# Patient Record
Sex: MALE | Race: OTHER
[De-identification: names, ages, dates, MRNs, and addresses within clinical notes are randomized per-mention and may not be internally consistent; named-entity substitution may affect disease eponyms.]

---

## 2018-01-26 NOTE — ANETHESIA PREOPERATIVE EVAL
Anesthesia Pre-op PMH/ROS


General


Date of Evaluation:  Jan 26, 2018


Time of Evaluation:  06:41


Anesthesiologist:  kay


Mallampati Score


Class I : Soft palate, uvula, fauces, pillars visible


Class II: Soft palate, uvula, fauces visible


Class III: Soft palate, base of uvula visible


Class IV: Only hard plate visible


Surgeon:  madi


Diagnosis:  gerd


Surgical Procedure:  egd


Allergies:  


Coded Allergies:  


     ACETAMINOPHEN (Verified  Adverse Reaction, Severe, Severe Abdominal Pain , 

2/15/18)


     HYDROCODONE (Verified  Adverse Reaction, Severe, Severe Abdominal Pain , 2/

15/18)


Medications:  see eMAR





Anesthesia Pre-op A/P


Risk Assessment & Plan


Assessment:


asa


Plan:


ARAVIND Trevino Jan 26, 2018 06:42

## 2018-02-16 ENCOUNTER — HOSPITAL ENCOUNTER (OUTPATIENT)
Dept: HOSPITAL 72 - GAS | Age: 43
Discharge: HOME | End: 2018-02-16
Payer: COMMERCIAL

## 2018-02-16 VITALS — SYSTOLIC BLOOD PRESSURE: 136 MMHG | DIASTOLIC BLOOD PRESSURE: 92 MMHG

## 2018-02-16 VITALS — SYSTOLIC BLOOD PRESSURE: 147 MMHG | DIASTOLIC BLOOD PRESSURE: 95 MMHG

## 2018-02-16 VITALS — DIASTOLIC BLOOD PRESSURE: 91 MMHG | SYSTOLIC BLOOD PRESSURE: 140 MMHG

## 2018-02-16 VITALS — SYSTOLIC BLOOD PRESSURE: 167 MMHG | DIASTOLIC BLOOD PRESSURE: 100 MMHG

## 2018-02-16 VITALS — DIASTOLIC BLOOD PRESSURE: 98 MMHG | SYSTOLIC BLOOD PRESSURE: 151 MMHG

## 2018-02-16 VITALS — BODY MASS INDEX: 27.2 KG/M2 | HEIGHT: 70 IN | WEIGHT: 190 LBS

## 2018-02-16 VITALS — SYSTOLIC BLOOD PRESSURE: 149 MMHG | DIASTOLIC BLOOD PRESSURE: 102 MMHG

## 2018-02-16 VITALS — DIASTOLIC BLOOD PRESSURE: 104 MMHG | SYSTOLIC BLOOD PRESSURE: 147 MMHG

## 2018-02-16 VITALS — DIASTOLIC BLOOD PRESSURE: 101 MMHG | SYSTOLIC BLOOD PRESSURE: 166 MMHG

## 2018-02-16 VITALS — DIASTOLIC BLOOD PRESSURE: 99 MMHG | SYSTOLIC BLOOD PRESSURE: 146 MMHG

## 2018-02-16 VITALS — SYSTOLIC BLOOD PRESSURE: 148 MMHG | DIASTOLIC BLOOD PRESSURE: 94 MMHG

## 2018-02-16 VITALS — SYSTOLIC BLOOD PRESSURE: 154 MMHG | DIASTOLIC BLOOD PRESSURE: 98 MMHG

## 2018-02-16 DIAGNOSIS — K44.9: ICD-10-CM

## 2018-02-16 DIAGNOSIS — F32.9: ICD-10-CM

## 2018-02-16 DIAGNOSIS — I10: ICD-10-CM

## 2018-02-16 DIAGNOSIS — F41.9: ICD-10-CM

## 2018-02-16 DIAGNOSIS — K21.9: ICD-10-CM

## 2018-02-16 DIAGNOSIS — K29.50: ICD-10-CM

## 2018-02-16 DIAGNOSIS — Z88.8: ICD-10-CM

## 2018-02-16 DIAGNOSIS — G47.33: ICD-10-CM

## 2018-02-16 DIAGNOSIS — K29.90: Primary | ICD-10-CM

## 2018-02-16 PROCEDURE — 94003 VENT MGMT INPAT SUBQ DAY: CPT

## 2018-02-16 PROCEDURE — 94150 VITAL CAPACITY TEST: CPT

## 2018-02-16 PROCEDURE — 43239 EGD BIOPSY SINGLE/MULTIPLE: CPT

## 2018-02-16 NOTE — PRE-OP HX & PHY REPO 2 SIG
DATE OF ADMISSION:  02/16/2018



HISTORY OF PRESENT ILLNESS:  The patient is a 43-year-old gentleman, who is

being seen prior to undergoing procedure of upper GI endoscopy, for which

he has been scheduled to receive for evaluation of his gastrointestinal

symptoms that he has been suffering subsequent to his work injury.

Basically, the applicant has been complaining of pain, which is

generalized over his abdominal area along with gastroesophageal symptoms

of acid reflux consistent with heartburn.  He also does have periods of

diarrhea and constipation.  He denies having any GI bleeding such as

hematemesis or melena.  The applicant was injured at his job site and

received multiple medications including nonsteroidal anti-inflammatory

agents such as Motrin, ibuprofen along with analgesics, which

gradually gave rise to the appearance of the above GI symptoms along with

significant anxiety and stress causing generalized abdominal pain as well.

The applicant has been injured at job site while he was working as a

 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

and he had injury of his right hand.  Subsequently, he was treated with

different medications under the care of different physicians as well.  He

has also been seen by different physicians including Dr. Nam in

December 2016 complaining of abdominal pain and GI symptoms for the GI

tract.



PAST MEDICAL HISTORY:  The applicant does have history of hypertension,

otherwise, nonsignificant.



PAST SURGICAL HISTORY:  The applicant has had the surgeries over the right

hand secondary to work trauma.



MEDICATIONS:  Omeprazole and nifedipine.



ALLERGIES:  To Norco.



HABITS:  He does not drink alcohol, but has been smoking cigarettes for

past 20 years.



REVIEW OF SYSTEMS:  Basically, history of present illness.



PHYSICAL EXAMINATION:

GENERAL:  An alert and oriented gentleman, does not seem to be in any acute

distress.  He looks well developed and nourished, quite pleasant.

VITAL SIGNS:  Vital signs are all stable.

HEENT:  Normocephalic.  Pupils equal in size and reactive to light and

accommodation.  No visible jaundice.  Buccal cavity, tongue midline, well

hydrated.  No ulcers.

NECK:  Supple.  No JVD, thyromegaly, or adenopathy.

CHEST:  Clear to auscultation and percussion.  No rales or rhonchi.

HEART:  S1 and S2 normal.  Regular rhythm.  No gallops or murmur.

ABDOMEN:  Soft, but there is mild tenderness basically on different parts

of the abdomen, especially in the right side, but there is no organomegaly

or abdominal mass noted at this time.

EXTREMITIES:  Unremarkable.



PRELIMINARY IMPRESSION:

1. Abdominal pain of uncertain etiology, rule out gastroesophageal acid

reflux aggravated by side effects of nonsteroidal anti-inflammatory

medication used for the treatment of bodily injury.

2. Underlying generalized abdominal pain, mostly consistent with

irritable bowel syndrome of posttraumatic stress type, irritable bowel

syndrome.



RECOMMENDATION:  The applicant seems to be stable at this time to undergo

the procedure of upper GI endoscopy, for which he has been scheduled.  He

understands the risks and benefits and will sign the consent.









  ______________________________________________

  Said GOLDIE Suarez





DR:  AJIT

D:  02/16/2018 11:41

T:  02/16/2018 19:41

JOB#:  [id number]

CC:



MELANI

## 2018-02-16 NOTE — DISCHARGE INSTRUCTIONS
Discharge Instructions


Discharge Instructions


Follow up with:  See the docotor after two weeks in the office.





For Congestive Heart Failure


Reminder


Report to your physician any weight gain of 5 pounds or more in one week.











NATALIA ROMEO Feb 16, 2018 11:35

## 2018-02-16 NOTE — SHORT STAY SURGERY H&P
History of Present Illness


History of Present Illness


Chief Complaint


Abdominal pains and GERDs (acid reflux).


MARIANNE Sosa is a 43 year old male who was admitted on  for GERDS/abdominal 

pains.





Patient History


Allergies:  


Coded Allergies:  


     ACETAMINOPHEN (Verified  Adverse Reaction, Severe, Severe Abdominal Pain , 

2/15/18)


     HYDROCODONE (Verified  Adverse Reaction, Severe, Severe Abdominal Pain , 2/

15/18)


PAST MEDICAL HISTORY:  


(1) H/O hand surgery


(2) Hypertension


Past Surgeries:  


Social History:  





Medication History


Scheduled


Multivitamin (Men's Multi-Vitamin), 1 EACH PO DAILY, (Reported)


Nifedipine (Nifedipine*), 10 MG ORAL DAILY, (Reported)


Zinc Gluconate (Zinc), 50 MG ORAL DAILY, (Reported)





Review of Systems


Cardiovascular:  Reports: no symptoms


Respiratory:  Reports: no symptoms


Skeletal:  Reports: trauma


Gastrointestinal:  Reports: gastro esophageal reflux disease


Genitourinary:  Reports: no symptoms


Neurologic:  Reports: neuropathy


Endocrine:  Reports: no symptoms


Hematologic:  Reports: no symptoms





Physical Exam


Vital Signs





Last Vital Signs








  Date Time  Temp Pulse Resp B/P (MAP) Pulse Ox O2 Delivery O2 Flow Rate FiO2


 


2/16/18 10:31 98.1 72 20 148/94 96 Room Air  








Skin:  normal


HENT:  normal


Heart:  normal


Lungs:  normal


Abdomen:  abnormal


Extremities:  normal


Genitourinary:  normal





Plan


Plan of Care


Upper GI endoscopy


Preop Interventions


none.


Summary of Findings


See the reports.


Final Diagnosis:  


Attestation


Are the patient's medical conditions optimized for surgery?


Attestation Response:  yes











NATALIA ROMEO Feb 16, 2018 11:11

## 2018-02-16 NOTE — 48 HOUR POST ANESTHESIA EVAL
Post Anesthesia Evaluation


Procedure:  EGD w/ biopsy


Date of Evaluation:  Feb 16, 2018


Time of Evaluation:  11:55


Blood Pressure Systolic:  134


0:  88


Pulse Rate:  75


Respiratory Rate:  20


Temperature (Fahrenheit):  97.6


O2 Sat by Pulse Oximetry:  99


Airway:  patent


Nausea:  No


Vomiting:  No


Pain Intensity:  0


Hydration Status:  adequate


Mental Status/LOC:  patient returned to baseline


Post-Anesthesia Complications:


none noted


Follow-up care needed:  patient intructions given











Marlen Garcia CRNA Feb 16, 2018 11:18

## 2018-02-16 NOTE — ANETHESIA PREOPERATIVE EVAL
Anesthesia Pre-op PMH/ROS


General


Date of Evaluation:  Feb 16, 2018


Time of Evaluation:  11:10


Anesthesiologist:  Jose


ASA Score:  ASA 2


Mallampati Score


Class I : Soft palate, uvula, fauces, pillars visible


Class II: Soft palate, uvula, fauces visible


Class III: Soft palate, base of uvula visible


Class IV: Only hard plate visible


Mallampati Classification:  Class II


Surgeon:  Daniela


Diagnosis:  GERD


Surgical Procedure:  EGD


Anesthesia History:  none


Family History:  no anesthesia problems


Allergies:  


Coded Allergies:  


     ACETAMINOPHEN (Verified  Adverse Reaction, Severe, Severe Abdominal Pain , 

2/15/18)


     HYDROCODONE (Verified  Adverse Reaction, Severe, Severe Abdominal Pain , 2/

15/18)


Medications:  see eMAR





Past Medical History


Cardiovascular:  Reports: HTN


Pulmonary:  Reports: MANASA


Gastrointestinal/Genitourinary:  Reports: GERD, other - chronic loose stools


Neurologic/Psychiatric:  Reports: depression/anxiety


HEENT:  Reports: other - nose bleeds hx


Other:  obesity


PSxH Narrative:


Right hand sx





Anesthesia Pre-op Phys. Exam


Physician Exam





Last Vital Signs








  Date Time  Temp Pulse Resp B/P (MAP) Pulse Ox O2 Delivery O2 Flow Rate FiO2


 


2/16/18 10:31 98.1 72 20 148/94 96 Room Air  








Constitutional:  NAD


Neurologic:  CN 2-12 intact


Cardiovascular:  RRR


Respiratory:  CTA


Gastrointestinal:  S/NT/ND





Airway Exam


Mallampati Score:  Class II


MO:  full


ROM:  full


Teeth:  missing - upper right side x 1, lower left side x 1, intact


Dentures:  no upper, no lower





Anesthesia Pre-op A/P


Risk Assessment & Plan


Assessment:


A&Ox4


Plan:


MAC


Status Change Before Surgery:  No





Pre-Antibiotics


Given Within 1 Hr of Incision:  No











Marlen Garcia CRNA Feb 16, 2018 11:17

## 2018-02-16 NOTE — PRE-PROCEDURE NOTE/ATTESTATION
Pre-Procedure Note/Attestation


Complete Prior to Procedure


Planned Procedure:  left


Procedure Narrative:


Examination of the upper GI tract through endoscopy.





Indications for Procedure


Pre-Operative Diagnosis:


R/O Peptic ulcer/gastritis/esophagitis.





Attestation


I attest that I discussed the nature of the procedure; its benefits; risks and 

complications; and alternatives (and the risks and benefits of such alternatives

), prior to the procedure, with the patient (or the patient's legal 

representative).





I attest that, if there was a reasonable possibility of needing a blood 

transfusion, the patient (or the patient's legal representative) was given the 

[place] Department [employer] standardized written summary, pursuant 

to the [place] Blood Safety Act ([place] Health and Safety Code # 1645, as 

amended).





I attest that I re-evaluated the patient just prior to the surgery and that 

there has been no change in the patient's H&P, except as documented below:











NATALIA ROMEO Feb 16, 2018 11:12

## 2018-02-16 NOTE — OPERATIVE NOTE - DICTATED
DATE OF OPERATION:  02/16/2018



SURGEON:  Gee Suarez M.D.



PROCEDURE:  Esophagogastroduodenoscopy with biopsy.



PREOPERATIVE DIAGNOSIS:  Abdominal pain.



POSTOPERATIVE DIAGNOSES:

1. Gastroduodenitis.  Biopsy was taken from duodenum and gastric

cavity.

2. Small hiatal hernia.



MEDICATION USED:  Per Ms. Marlen Garcia, anesthetist.



INSTRUMENT:  GIF Olympus upper gastrointestinal video endoscope.



DESCRIPTION OF PROCEDURE:  The patient after arriving an endoscopy unit,

was told about the risks and benefits of the procedure, which he accepted

and signed the informed consent.  He was then put on the left lateral

decubitus position.  After adequate IV sedation, the scope was gently

passed through the cricopharyngeal area, was lodged into the esophagus,

and gradually advanced towards the gastroesophageal junction.  The entire

length of the esophagus looked normal and there was no any abnormality

such as esophagitis, stricture, varices, etc.  GE junction examined and it

revealed evidence of a small sliding hiatal hernia of no great

significance and there were no Bernal's.



The scope was then guided into the stomach.  Gastric cavity was

distended with insufflation of air revealing mild erythema over the

gastric body consistent with gastritis and one random biopsy from gastric

body obtained.  Subsequently, the scope was passed through the antrum and

from there into somewhat edematous pyloric channel basically at 9 o'clock

was seen.  There was no ulcers.  At this time, the scope was advanced into

the duodenal bulb, which revealed evidence of mild inflammatory process

basically over the distal part of the duodenum with some edema and

erythema, but no ulceration.  One biopsy from this area was also obtained

and sent to pathology lab.  Second portion of duodenum looked normal.  At

this time, the scope was pulled back into the stomach.  A retroflexion

maneuver was applied.  The area of the upper gastroesophageal junction was

examined closely and that did not reveal any other abnormalities.

Finally, the scope was pulled out and the procedure was terminated.  The

patient tolerated the procedure well and left the endoscopy room in good

condition.









  ______________________________________________

  Gee Suarez M.D.





DR:  MILADIS

D:  02/16/2018 11:43

T:  02/16/2018 20:55

JOB#:  [id number]

CC:

## 2018-02-16 NOTE — ENDOSCOPY PROCEDURE NOTE
Endoscopy Procedure Note


General


Indication for Procedure:  Abdominal pains and GERDs


Procedures Performed:  EGD - Gastrodoudenitis, biopsy obtained from gastric 

cavity and duodenum. Small Hiatal Hernia.


Specimen:  yes


Pt Tolerated Procedure Well:  Yes


Estimated Blood Loss:  none





Anesthesia


Anesthesiologist:  Marlen Garcia RN


Anesthesia:  moderate sedation





Medications


Medication Given:  see anesthesia record





Inserted Devices


Implant(s) used?:  No





GI Core Measures


50 yrs or older w/o bx or poly:  Not Applicable


10yrs. F/U not recommended:  Not Applicable


If not recommended, why?:  


Med reason:<3 yrs.:  


System Reason:<3 yrs.:  











NATALIA ROMEO Feb 16, 2018 11:34

## 2018-02-16 NOTE — IMMEDIATE POST-OP EVALUATION
Immediate Post-Op Evalulation


Immediate Post-Op Evalulation


Procedure:  EGD


Date of Evaluation:  Feb 16, 2018


Time of Evaluation:  11:42


IV Fluids:   ml


Blood Products:  0


Estimated Blood Loss:  0


Urinary Output:  0


Blood Pressure Systolic:  136


Blood Pressure Diastolic:  92


Pulse Rate:  75


Respiratory Rate:  20


O2 Sat by Pulse Oximetry:  99


Temperature (Fahrenheit):  97.6


Pain Score (1-10):  0


Nausea:  No


Vomiting:  No


Complications


none noted


Patient Status:  awake, reacts


Hydration Status:  adequate


Given Within 1 Hr of Incision:  Marlen Lea CRNA Feb 16, 2018 11:18